# Patient Record
(demographics unavailable — no encounter records)

---

## 2025-03-13 NOTE — HISTORY OF PRESENT ILLNESS
[FreeTextEntry1] : weight management [de-identified] : This is a 50yr old pt with pmh significant for class 1 obesity, hld presenting for weight management. Pt was previously tried on Wegovy with minimal change in her weight. Of note, Pt was only on 0.5mg at max. Furthermore, her use was inconsistent. She is ready to restart her weightloss journey and be consistent with her GLP-1  breakfast: banana and coffee lunch salad, 2 hard boiled eggs, an orange dinner salmon and veggies   exercise: pt is going to [employer] 3-4 times a week

## 2025-03-13 NOTE — HISTORY OF PRESENT ILLNESS
[FreeTextEntry1] : weight management [de-identified] : This is a 50yr old pt with pmh significant for class 1 obesity, hld presenting for weight management. Pt was previously tried on Wegovy with minimal change in her weight. Of note, Pt was only on 0.5mg at max. Furthermore, her use was inconsistent. She is ready to restart her weightloss journey and be consistent with her GLP-1  breakfast: banana and coffee lunch salad, 2 hard boiled eggs, an orange dinner salmon and veggies   exercise: pt is going to [employer] 3-4 times a week

## 2025-04-11 NOTE — ASSESSMENT
[FreeTextEntry1] : Problems Rhinitis Mild pharyngitis Her physical examination was completely normal.  There was no evidence of any bacterial infection.  Swabs were obtained for COVID RSV influenza A and influenza B.  I treated the patient symptomatically and informed her to call if there was any change in her condition or if there is any change in the color of her mucous

## 2025-04-11 NOTE — HISTORY OF PRESENT ILLNESS
[FreeTextEntry8] : This is a 50-year-old healthy patient who is complaining of a low-grade fever with nasal congestion and mild pharyngitis.  She denies any anosmia nausea vomiting